# Patient Record
Sex: FEMALE | URBAN - METROPOLITAN AREA
[De-identification: names, ages, dates, MRNs, and addresses within clinical notes are randomized per-mention and may not be internally consistent; named-entity substitution may affect disease eponyms.]

---

## 2019-06-20 ENCOUNTER — HOSPITAL ENCOUNTER (EMERGENCY)
Facility: MEDICAL CENTER | Age: 29
End: 2019-06-20

## 2019-06-20 VITALS
BODY MASS INDEX: 13.18 KG/M2 | TEMPERATURE: 96.6 F | RESPIRATION RATE: 16 BRPM | DIASTOLIC BLOOD PRESSURE: 94 MMHG | OXYGEN SATURATION: 98 % | HEART RATE: 123 BPM | HEIGHT: 66 IN | SYSTOLIC BLOOD PRESSURE: 146 MMHG | WEIGHT: 82 LBS

## 2019-06-20 PROCEDURE — 302449 STATCHG TRIAGE ONLY (STATISTIC)

## 2019-06-21 NOTE — ED TRIAGE NOTES
"Alena Stewart  28 y.o.  Chief Complaint   Patient presents with   • Hand Injury     RIGHT - \"I tripped over a dog and fell into an organizer. I think I broke 3 of my fingers\"     Ambulatory to triage with steady gait for above.    R hand noted to be swollen. Limited ROM.    Ice applied PTA with no relief.    Triage process explained to patient, apologized for wait time, and returned to lobby.  "